# Patient Record
Sex: FEMALE | Race: BLACK OR AFRICAN AMERICAN | URBAN - METROPOLITAN AREA
[De-identification: names, ages, dates, MRNs, and addresses within clinical notes are randomized per-mention and may not be internally consistent; named-entity substitution may affect disease eponyms.]

---

## 2019-01-03 ENCOUNTER — OFFICE VISIT (OUTPATIENT)
Dept: VASCULAR SURGERY | Age: 69
End: 2019-01-03

## 2019-01-03 VITALS
SYSTOLIC BLOOD PRESSURE: 120 MMHG | DIASTOLIC BLOOD PRESSURE: 58 MMHG | WEIGHT: 105 LBS | BODY MASS INDEX: 17.49 KG/M2 | HEIGHT: 65 IN | HEART RATE: 88 BPM | RESPIRATION RATE: 15 BRPM

## 2019-01-03 DIAGNOSIS — L81.9 DISCOLORATION OF SKIN OF FINGER: Primary | ICD-10-CM

## 2019-01-03 DIAGNOSIS — N12 PYELONEPHRITIS: ICD-10-CM

## 2019-01-03 DIAGNOSIS — I77.9 BILATERAL CAROTID ARTERY DISEASE, UNSPECIFIED TYPE (HCC): ICD-10-CM

## 2019-01-03 DIAGNOSIS — I26.99 OTHER ACUTE PULMONARY EMBOLISM WITHOUT ACUTE COR PULMONALE (HCC): ICD-10-CM

## 2019-01-03 DIAGNOSIS — I77.1 SUBCLAVIAN ARTERY STENOSIS, RIGHT (HCC): ICD-10-CM

## 2019-01-03 RX ORDER — POTASSIUM CHLORIDE 20 MEQ/1
TABLET, EXTENDED RELEASE ORAL 2 TIMES DAILY
COMMUNITY

## 2019-01-03 RX ORDER — GUAIFENESIN 100 MG/5ML
81 LIQUID (ML) ORAL DAILY
COMMUNITY

## 2019-01-03 RX ORDER — MULTIVITAMIN
1 TABLET ORAL DAILY
COMMUNITY

## 2019-01-03 RX ORDER — ATORVASTATIN CALCIUM 40 MG/1
TABLET, FILM COATED ORAL DAILY
COMMUNITY

## 2019-01-03 RX ORDER — METOPROLOL SUCCINATE 25 MG/1
TABLET, EXTENDED RELEASE ORAL DAILY
COMMUNITY

## 2019-01-03 RX ORDER — ASCORBIC ACID 500 MG
TABLET ORAL
COMMUNITY

## 2019-01-03 RX ORDER — LISINOPRIL 20 MG/1
TABLET ORAL DAILY
COMMUNITY

## 2019-01-03 RX ORDER — ONDANSETRON 4 MG/1
4 TABLET, FILM COATED ORAL
COMMUNITY

## 2019-01-03 RX ORDER — TRAMADOL HYDROCHLORIDE 50 MG/1
50 TABLET ORAL
COMMUNITY

## 2019-01-03 NOTE — PROGRESS NOTES
1. Have you been to an emergency room or urgent care clinic since your last visit? [facility] since your last visit? If yes, where, when, and reason for visit? [facility] 2. Have you seen or consulted any other health care providers outside of the [facility] since your last visit including any procedures, health maintenance items. If yes, where, when and reason for visit?  No

## 2019-01-03 NOTE — PROGRESS NOTES
Corie Velázquez Chief Complaint Patient presents with  New Patient PAD HPI Meka Fagan is a 76 y.o. female who presents to the office today at the request of her nursing facility for discoloration and ulcer of her right third and fourth fingertips. Patient has complicated PMH to include recent hospitalization for ESBL Klebsiella right pyelonephritis / renal abscess + bacteremia and bilateral PE. She is on Xarelto for anticoagulation. She does also have acute renal failure during her hospital admission but her kidney function seems to have recovered nicely. Patient states that about a week or more ago she noticed some discoloration to the fingertips of her right hand. She states that the tips of 2 of her fingers were black and painful. She has been receiving local wound care from her nursing facility and she states that the fingers are much improved. She states the blackness to her fingertips has completely resolved. She denies any pain currently. She did have arterial studies which showed moderate right upper extremity insufficiency with moderate stenosis between the right subclavian and axillary arteries. She denies any symptoms of claudication or pain in the right arm. She denies any temperature changes to the upper extremities. She states that she has no problems whatsoever with her right upper extremity otherwise. She does have some arthritic pain on her left side in both the upper and lower extremities. She also has history of right-sided stroke which appears to have affected her left side. She does also have history of right internal carotid artery occlusion. It is unclear if she was on pressors during her recent hospital admission or not. She denies any symptoms of lower extremity claudication. She states that she is feeling much better and regaining her strength. She is ambulating with a walker at her nursing facility.   She states that she is working with PT and OT without issue. She does continue with very poor appetite and has had significant weight loss. She does not describe any abdominal pain or food fear. Has had multiple CT scans of her chest abdomen and pelvis in the past with no evidence of aneurysms or arterial dissection seen. She does have history of tobacco abuse but states that she has quit since her hospital admission. No personal history of diabetes. She denies any fevers or chills. She does not complain of any chest pain or shortness of breath. Past Medical History:  
Diagnosis Date  Anemia  Bilateral nephrolithiasis  CAD (coronary artery disease)  Cholelithiasis  DDD (degenerative disc disease), lumbar  GERD (gastroesophageal reflux disease)  History of arterial ischemic stroke  Hypertension  LVH (left ventricular hypertrophy)   
 severe  Osteoarthritis of left hip  Peptic ulcer disease with hemorrhage  Pyelonephritis  Severe aortic stenosis Past Surgical History:  
Procedure Laterality Date  CYSTOSCOPY,INSERT URETERAL STENT    
 HX HEART CATHETERIZATION Current Outpatient Medications Medication Sig Dispense Refill  aspirin 81 mg chewable tablet Take 81 mg by mouth daily.  atorvastatin (LIPITOR) 40 mg tablet Take  by mouth daily.  calcium-cholecalciferol, D3, (CALTRATE 600+D) tablet Take 1 Tab by mouth daily.  LINEZOLID PO Take  by mouth.  lisinopril (PRINIVIL, ZESTRIL) 20 mg tablet Take  by mouth daily.  metoprolol succinate (TOPROL-XL) 25 mg XL tablet Take  by mouth daily.  MULTIVITAMIN PO Take  by mouth.  potassium chloride (K-DUR, KLOR-CON) 20 mEq tablet Take  by mouth two (2) times a day.  traMADol (ULTRAM) 50 mg tablet Take 50 mg by mouth every six (6) hours as needed for Pain.  ascorbic acid, vitamin C, (VITAMIN C) 500 mg tablet Take  by mouth.  rivaroxaban (XARELTO) 20 mg tab tablet Take  by mouth daily.  zinc oxide-cod liver oil (DESITIN) 40 % ointment Apply  to affected area as needed for Skin Irritation.  ondansetron hcl (ZOFRAN) 4 mg tablet Take 4 mg by mouth every eight (8) hours as needed for Nausea. Allergies Allergen Reactions  Sulfa (Sulfonamide Antibiotics) Rash Social History Socioeconomic History  Marital status:  Spouse name: Not on file  Number of children: Not on file  Years of education: Not on file  Highest education level: Not on file Social Needs  Financial resource strain: Not on file  Food insecurity - worry: Not on file  Food insecurity - inability: Not on file  Transportation needs - medical: Not on file  Transportation needs - non-medical: Not on file Occupational History  Not on file Tobacco Use  Smoking status: Former Smoker  Smokeless tobacco: Never Used Substance and Sexual Activity  Alcohol use: No  
  Frequency: Never  Drug use: Not on file  Sexual activity: Not on file Other Topics Concern  Not on file Social History Narrative  Not on file Family History Problem Relation Age of Onset  Heart Failure Mother  Coronary Artery Disease Father  Lung Cancer Sister Review of Systems Constitutional: Positive for generalized weakness and fatigue HEENT: negative Respiratory: negative Cardiovascular: negative Gastrointestinal: negative Genitourinary:negative Hematologic/lymphatic: negative Musculoskeletal: Positive for left upper and lower extremity arthritic pain, right 3rd and 4th finger discoloration and ulcer Neurological: negative Behavioral/Psych: negative Endocrine: negative Allergic/Immunologic: negative Physical Exam:   
Visit Vitals /58 (BP 1 Location: Right arm) Pulse 88 Resp 15 Ht 5' 5\" (1.651 m) Wt 105 lb (47.6 kg) BMI 17.47 kg/m² General:  female in no acute distress. Pt in wheelchair HEENT: EOMI, no scleral icterus is noted. Radiation of heart murmur sounds in bilateral carotids. Cardiovascular: Regular rhythm normal S1-S2 no rubs or gallops. +APOLONIA Pulmonary: No increased work or breathing is noted. Clear to auscultation bilaterally. No wheeze, rales or rhonchi. Abdomen: cachectic , nondistended. Extremities: Warm and well perfused bilaterally. No BLE edema. 2+ radial pulses bilaterally. Right 4th finger tip with small ulceration and mild skin discoloration (appears to be healing). Neuro: Cranial nerves II through XII are grossly intact Integument: No ulcerations are identified visibly Impression and Plan: 
Fernanda Simpson is a 76 y.o. female with discoloration and ulcer to her right third and fourth fingertips. Arterial studies did show moderate arterial insufficiency in the right upper extremity with moderate stenosis noted between the right subclavian and axillary arteries. She had a 20 mmHg difference in bilateral BP's today in the office. Fortunately her symptoms are resolving with local wound care. She states that the skin discoloration is almost completely resolved at this point. She does not describe any symptoms of claudication in the right arm. I did discuss the possibility of intervention with angiogram but seeing as her symptoms are resolving at this time she is not complaining of any right arm symptoms discussed that this would be quite aggressive. I did discuss the possibility of obtaining a CTA scan to further assess however patient does not wish to undergo any invasive procedures including diet at this time due to her recent illness and hospital admission. We agreed that since she is on full anticoagulation and her symptoms are resolving that we will have her follow-up in 3-4 weeks to reassess. We will also obtain carotid ultrasounds and repeat arterial studies at that time.   Further surgical discussion pending her evaluation and imaging results at that time. She is certainly understanding to call the office sooner if her symptoms worsen or she develops any new vascular concerns. She will continue with local wound care at her nursing facility as well. Plan was discussed. I did also advise her that her BP should be taken on her left arm due to the right arterial stenosis. Patient expresses understanding and agrees. [address] [place] [address] 
 
 
PLEASE NOTE: 
This document has been produced using voice recognition software. Unrecognized errors in transcription may be present.

## 2019-02-06 ENCOUNTER — DOCUMENTATION ONLY (OUTPATIENT)
Dept: VASCULAR SURGERY | Age: 69
End: 2019-02-06

## 2019-02-06 NOTE — PROGRESS NOTES
Patient's spouse called and stated the patient was non compliant and would not get dressed to come to her appointment today. He also mentioned that she was bleeding from under her nails. I did advise the patient to go to the ED.  And to give us a call later to let us know what's going on

## 2019-10-25 ENCOUNTER — TELEPHONE (OUTPATIENT)
Dept: CARDIAC REHAB | Age: 69
End: 2019-10-25

## 2019-10-25 NOTE — TELEPHONE ENCOUNTER
Cardiac rehab called patient and left message about program. Future attempts to contact patient will be made.     Thank you,    Pili Rizzo  Pulmonary Rehab Coordinator

## 2019-10-30 ENCOUNTER — TELEPHONE (OUTPATIENT)
Dept: CARDIAC REHAB | Age: 69
End: 2019-10-30

## 2019-10-30 NOTE — TELEPHONE ENCOUNTER
Cardiac rehab called and LM about program. Future attempts will be made.     Thank you,    Maddy Guerrero  Pulmonary Rehab Coordinator

## 2019-11-05 ENCOUNTER — TELEPHONE (OUTPATIENT)
Dept: CARDIAC REHAB | Age: 69
End: 2019-11-05

## 2019-11-05 NOTE — TELEPHONE ENCOUNTER
Cardiac rehab reached out to patient and left message. Future attempts will be made.     Messages x3 left

## 2019-11-07 ENCOUNTER — TELEPHONE (OUTPATIENT)
Dept: CARDIAC REHAB | Age: 69
End: 2019-11-07

## 2019-11-21 ENCOUNTER — HOSPITAL ENCOUNTER (OUTPATIENT)
Dept: CARDIAC REHAB | Age: 69
Discharge: HOME | End: 2019-11-21
Payer: MEDICARE

## 2019-11-21 VITALS — BODY MASS INDEX: 19.64 KG/M2 | WEIGHT: 118 LBS

## 2019-11-21 PROCEDURE — 93798 PHYS/QHP OP CAR RHAB W/ECG: CPT

## 2019-11-21 NOTE — PROGRESS NOTES
Cardiac ITP/Initial Assessment for signature. Cardiac Rehab Initial Treatment Note/Plan    Corie Velázquez 76 y.o. presented to cardiac rehab for an intake and a six minute walk test today with a primary diagnosis of TAVR. Patient's EF is 55%. Aimee Oswald has a history of Hypertension, Hyperlipidemia and history of prior Stroke. Cardiac risk factors include dyslipidemia, hypertension and these were reviewed with patient. Aimee Oswald is  and lives with spouse. PHQ-9, depression score, is 0 and this is considered to be normal. The result was discussed with patient. Patient denied chest pain, rated SOB 3/10 during 6 minute walk and the cardiac rhythm was in Sinus Tachycardia. Corie Velázquez will attend exercise and educational sessions 2 days a week in cardiac rehab for 17 weeks. Goals for Rehab:    Patient name: Aimee Oswald : 1950       Goals Comments   1. Increase strength   [x] initial  [] met                  [] not met  [] progressing  able to tolerate 5 minutes walking in hallway by next month. 2. Less sob on exertion   [x] initial  [] met                  [] not met  [] progressing Rated 3/10 now, will achieve 2/06 by next recert. Myah Logan RN 2019 2:07 PM      CR INTAKE from 2019 in [place] [address]   Treatment Diagnosis   Treatment Diagnosis 1 TONY   Individual Treatment Plan   ITP Visit Type Initial Assessment   ITP Next Review Date 19   Visit #/Total Visits    ITP Exercise, Psychosocial, Tobacco, Nutrition, Education   Exercise    Stages of Change Other (comment)   DASI Total Score 7.2   Assisted Devices Wheelchair, Walker   Test Six minute walk test   Exercise Prescription   Mode Bike, Stepper, Ergometer   Frequency per week 2x/week   Duration per session 35-45 min. Intensity  METS       2-3   RPE 11-13   Target Heart Rate    Resistance Training Yes   Exercise Blood Pressures   Resting /73   Peak /75   Is BP WDL? No   Exercise Activity at Home   Type None   Resistance Training No   Exercise Education   Education Exercise safety, Self pulse, Signs/Symptoms to report, RPE scale, Equipment orientation, Warm up/Cool down, Physically active   Exercise Target Goal   Target Goal(s) BP < 140/90 or < 130/80, if DM or CKD, Individual exercise RX, Aerobic activity 30 + minutes/day  5 days/week   Patient Stated Exercise Goals Patient wants to get stronger   Psychosocial   Stages of Change Other (comment)   [facility] Total Score 15   PHQ 9 Score 0   Psychosocial Intervention   Interventions No intervention indicated   Currently Taking Psychotropic Meds No   Psychosocial Education   Education Advanced directives, Benefits of CPR completion, Cardiac meds, Coping techniques, Environmental triggers, Impact self care behaviors on health, Relaxation techniques, Sexual activity, Signs/Symptoms of depression, Stress management class, Support groups, Tobacco dangers   Psychosocial Target Goals   Target Goal(s) Assess presence or absence of depression using a valid screening tool, Demonstrates appropriate interaction with others, Engages in self-care behaviors, Maximizes coping skills, Positive support group   Uses Stress Mgmt Techniques Yes  [Watch TV]   Patient Stated Psychosocial Goals Patient does not feel depressed, anxious, or stressed.    Nutrition   Stages of Change Preparation   Diabetes No   Lipids   Weight Management   Weight  53.5 kg (118 lb)   Height  5' 5\" (1.651 m)   BMI 19.68   Waist Circumference  34   Alcohol None   Nutrition Intervention   Dietitian Consult No   Nurse/Patient Discussion Yes   Nutrition Class Yes   Diabetes Education Referral No   Lipid Clinic Referral No   Weight Management Referral No   Nutrition Education   Education Low fat & cholesterol diet, Carb-controlled diet, Low sodium diet, High fiber diet, Healthy eating   Nutrition Target Goals   Education   Learning Barrier Ready to learn   Knowledge Test Score N/A Education Intervention   Education Schedule Given No   Patient Education    Education CAD, Risk factors, Med Compliance, Cardiac A&P, Signs/Symptoms of Angina, Sexuality   Hypertension Yes   Hypertension Controlled Yes   Is BP WDL?  No   Med(s) Change No   BP Meds Toprol, Lisinopril   Education Target Goals   Target Goals Medication compliance, Risk factors, Understand target guidelines for lipids, Understand target guidelines for B/P   Physician Response   Exercise      CR INTAKE from 11/21/2019 in [facility]   Any problems changes since your last visit --  [Initial Assessment.]   Any symptoms while exercising sob, rated 3/10   Resting EKG rhythm nsr   ITP Next Review Date 12/12/19   Visit Number/Total Visits 1/36   On Call Medical Director Immediately Available Onesimo Azar   Target Heart Rate(Range)    Resting HR 62   Resting /73   Recovery HR 75   Recovery /75   Weight 53.5 kg (118 lb)   Exercise EKG Rhythm st   Exercise Duration 6   Peak    Peak RPE 9   SOB yes, rated 3/10   Asymptomatic yes   Total Minutes 6

## 2019-11-26 ENCOUNTER — TELEPHONE (OUTPATIENT)
Dept: CARDIAC REHAB | Age: 69
End: 2019-11-26

## 2019-11-26 NOTE — TELEPHONE ENCOUNTER
Cardiac Rehab received a call from patient stating that her  is going to the ED so she will miss her visit today. She is hoping to return to rehab on 12/3/19.     Thank you,  Torres English

## 2019-12-03 ENCOUNTER — APPOINTMENT (OUTPATIENT)
Dept: CARDIAC REHAB | Age: 69
End: 2019-12-03

## 2019-12-03 ENCOUNTER — TELEPHONE (OUTPATIENT)
Dept: CARDIAC REHAB | Age: 69
End: 2019-12-03

## 2019-12-03 NOTE — TELEPHONE ENCOUNTER
Cardiac Rehab received a call from patient stating that she does not have reliable transportation to come to her appointments. She wishes to be discharged at this time.     Thank you,  Ysabel Walls

## 2019-12-05 ENCOUNTER — APPOINTMENT (OUTPATIENT)
Dept: CARDIAC REHAB | Age: 69
End: 2019-12-05

## 2019-12-10 ENCOUNTER — HOSPITAL ENCOUNTER (OUTPATIENT)
Dept: CARDIAC REHAB | Age: 69
End: 2019-12-10

## 2019-12-12 ENCOUNTER — APPOINTMENT (OUTPATIENT)
Dept: CARDIAC REHAB | Age: 69
End: 2019-12-12

## 2019-12-17 ENCOUNTER — APPOINTMENT (OUTPATIENT)
Dept: CARDIAC REHAB | Age: 69
End: 2019-12-17

## 2019-12-19 ENCOUNTER — APPOINTMENT (OUTPATIENT)
Dept: CARDIAC REHAB | Age: 69
End: 2019-12-19

## 2019-12-24 ENCOUNTER — APPOINTMENT (OUTPATIENT)
Dept: CARDIAC REHAB | Age: 69
End: 2019-12-24

## 2019-12-26 ENCOUNTER — APPOINTMENT (OUTPATIENT)
Dept: CARDIAC REHAB | Age: 69
End: 2019-12-26

## 2019-12-31 ENCOUNTER — APPOINTMENT (OUTPATIENT)
Dept: CARDIAC REHAB | Age: 69
End: 2019-12-31

## 2020-01-02 ENCOUNTER — APPOINTMENT (OUTPATIENT)
Dept: CARDIAC REHAB | Age: 70
End: 2020-01-02

## 2020-01-07 ENCOUNTER — APPOINTMENT (OUTPATIENT)
Dept: CARDIAC REHAB | Age: 70
End: 2020-01-07

## 2020-01-09 ENCOUNTER — APPOINTMENT (OUTPATIENT)
Dept: CARDIAC REHAB | Age: 70
End: 2020-01-09

## 2020-01-14 ENCOUNTER — APPOINTMENT (OUTPATIENT)
Dept: CARDIAC REHAB | Age: 70
End: 2020-01-14

## 2020-01-16 ENCOUNTER — APPOINTMENT (OUTPATIENT)
Dept: CARDIAC REHAB | Age: 70
End: 2020-01-16

## 2020-01-21 ENCOUNTER — APPOINTMENT (OUTPATIENT)
Dept: CARDIAC REHAB | Age: 70
End: 2020-01-21

## 2020-01-23 ENCOUNTER — APPOINTMENT (OUTPATIENT)
Dept: CARDIAC REHAB | Age: 70
End: 2020-01-23

## 2020-01-28 ENCOUNTER — APPOINTMENT (OUTPATIENT)
Dept: CARDIAC REHAB | Age: 70
End: 2020-01-28

## 2020-01-30 ENCOUNTER — APPOINTMENT (OUTPATIENT)
Dept: CARDIAC REHAB | Age: 70
End: 2020-01-30

## 2020-02-04 ENCOUNTER — APPOINTMENT (OUTPATIENT)
Dept: CARDIAC REHAB | Age: 70
End: 2020-02-04

## 2020-02-06 ENCOUNTER — APPOINTMENT (OUTPATIENT)
Dept: CARDIAC REHAB | Age: 70
End: 2020-02-06

## 2020-02-11 ENCOUNTER — APPOINTMENT (OUTPATIENT)
Dept: CARDIAC REHAB | Age: 70
End: 2020-02-11

## 2020-02-13 ENCOUNTER — APPOINTMENT (OUTPATIENT)
Dept: CARDIAC REHAB | Age: 70
End: 2020-02-13

## 2020-02-18 ENCOUNTER — APPOINTMENT (OUTPATIENT)
Dept: CARDIAC REHAB | Age: 70
End: 2020-02-18

## 2020-02-20 ENCOUNTER — APPOINTMENT (OUTPATIENT)
Dept: CARDIAC REHAB | Age: 70
End: 2020-02-20

## 2020-02-25 ENCOUNTER — APPOINTMENT (OUTPATIENT)
Dept: CARDIAC REHAB | Age: 70
End: 2020-02-25

## 2020-02-27 ENCOUNTER — APPOINTMENT (OUTPATIENT)
Dept: CARDIAC REHAB | Age: 70
End: 2020-02-27

## 2020-03-03 ENCOUNTER — APPOINTMENT (OUTPATIENT)
Dept: CARDIAC REHAB | Age: 70
End: 2020-03-03

## 2020-03-05 ENCOUNTER — APPOINTMENT (OUTPATIENT)
Dept: CARDIAC REHAB | Age: 70
End: 2020-03-05

## 2020-03-10 ENCOUNTER — APPOINTMENT (OUTPATIENT)
Dept: CARDIAC REHAB | Age: 70
End: 2020-03-10

## 2020-03-12 ENCOUNTER — APPOINTMENT (OUTPATIENT)
Dept: CARDIAC REHAB | Age: 70
End: 2020-03-12

## 2020-03-17 ENCOUNTER — APPOINTMENT (OUTPATIENT)
Dept: CARDIAC REHAB | Age: 70
End: 2020-03-17

## 2020-03-19 ENCOUNTER — APPOINTMENT (OUTPATIENT)
Dept: CARDIAC REHAB | Age: 70
End: 2020-03-19

## 2020-03-24 ENCOUNTER — APPOINTMENT (OUTPATIENT)
Dept: CARDIAC REHAB | Age: 70
End: 2020-03-24

## 2020-03-26 ENCOUNTER — APPOINTMENT (OUTPATIENT)
Dept: CARDIAC REHAB | Age: 70
End: 2020-03-26

## 2020-11-19 ENCOUNTER — TRANSCRIBE ORDER (OUTPATIENT)
Dept: SCHEDULING | Age: 70
End: 2020-11-19

## 2020-11-19 DIAGNOSIS — N18.4 CHRONIC KIDNEY DISEASE, STAGE IV (SEVERE) (HCC): Primary | ICD-10-CM
